# Patient Record
Sex: MALE | Race: WHITE | NOT HISPANIC OR LATINO
[De-identification: names, ages, dates, MRNs, and addresses within clinical notes are randomized per-mention and may not be internally consistent; named-entity substitution may affect disease eponyms.]

---

## 2019-04-24 PROBLEM — Z00.00 ENCOUNTER FOR PREVENTIVE HEALTH EXAMINATION: Status: ACTIVE | Noted: 2019-04-24

## 2019-04-26 ENCOUNTER — APPOINTMENT (OUTPATIENT)
Dept: PHYSICAL MEDICINE AND REHAB | Facility: CLINIC | Age: 59
End: 2019-04-26
Payer: COMMERCIAL

## 2019-04-26 VITALS — BODY MASS INDEX: 25.73 KG/M2 | WEIGHT: 190 LBS | HEIGHT: 72 IN

## 2019-04-26 DIAGNOSIS — Z78.9 OTHER SPECIFIED HEALTH STATUS: ICD-10-CM

## 2019-04-26 PROCEDURE — 99214 OFFICE O/P EST MOD 30 MIN: CPT

## 2019-04-26 PROCEDURE — 72100 X-RAY EXAM L-S SPINE 2/3 VWS: CPT

## 2019-04-26 RX ORDER — CETIRIZINE HYDROCHLORIDE 10 MG/1
CAPSULE, LIQUID FILLED ORAL
Refills: 0 | Status: ACTIVE | COMMUNITY

## 2019-04-26 NOTE — HISTORY OF PRESENT ILLNESS
[FreeTextEntry1] : Location: back\par Quality: ache\par Severity: 4/10\par Duration: 1 month\par Timing: acute\par Context: training for 10 mile run\par Aggravating Factors: after run\par Alleviating Factors: stretch, rest\par Associated Symptoms: denies weight loss, fever, chills, change in bowel/bladder habits, redness, warmth, weakness, numbness/tingling, radiation down \par Prior Studies:\par

## 2019-04-26 NOTE — PHYSICAL EXAM
[FreeTextEntry1] : TRYA is a 58 year male \par Constitutional: healthy appearing, NAD, and overweight\par \par LUMBAR\par ROM: flexion to 40 deg, ext to 5 deg with pain\par \par Gait: normal\par \par Inspection: no erythema, warmth\par Spine: no TTP in spinous process, SI joint, sacrum\par Bony palpation: no TTP in GT\par \par Soft tissue palpation hip: no TTP in gluteus khloe, medius\par Soft tissue palpation of spine: no TTP in lumbar paraspinals\par \par 5/5 bilateral HF, KE, DF, PF \par      left hip abd 4/5\par sensation intact in bilat LE\par reflexes: knee and ankle 1+ bilat\par \par Special tests: neg seated SLR\par          poor single leg squat\par

## 2019-04-26 NOTE — ASSESSMENT
[FreeTextEntry1] : Ice area often. Does not want PT.  Gave exercises to do. Run in both directions in park.

## 2020-01-14 ENCOUNTER — APPOINTMENT (OUTPATIENT)
Dept: ORTHOPEDIC SURGERY | Facility: CLINIC | Age: 60
End: 2020-01-14
Payer: COMMERCIAL

## 2020-01-14 DIAGNOSIS — M94.269 CHONDROMALACIA, UNSPECIFIED KNEE: ICD-10-CM

## 2020-01-14 PROCEDURE — 73562 X-RAY EXAM OF KNEE 3: CPT | Mod: LT

## 2020-01-14 PROCEDURE — 99213 OFFICE O/P EST LOW 20 MIN: CPT

## 2020-01-14 NOTE — ASSESSMENT
[FreeTextEntry1] : Discussed at length with patient exam history and imaging.  If no improvement with home exercises and physical therapy consideration of cortisone shot and ultimately if no improvement MRI with possible need for lateral release

## 2020-01-14 NOTE — HISTORY OF PRESENT ILLNESS
[de-identified] : Patient reports that the LEFT knee has been bothering him for about 1.5 months now, atraumatic. Sharp pain with certain movements. Knee is achy at night. Tender to the touch. Pain mostly in the front of the knee and to the side. Sharp pain with twisting/turning. Some pain with bending.

## 2020-01-14 NOTE — PHYSICAL EXAM
[de-identified] : X-ray left knee.  There is no significant bony / soft tissue abnormality, arthritis, or fracture except mild lateral patellar tilt\par \par \par \par \par \par  [de-identified] : Left knee\par \par Constitutional: \par The patient is healthy-appearing and in no apparent distress. \par \par Gait:\par The patient ambulates with a normal gait and no limp.\par \par Cardiovascular System: \par The capillary refill is less than 2 seconds. \par \par Skin: \par There are no skin abnormalities.\par \par Left Knee: \par \par Bony Palpation: \par There is no tenderness of the medial joint line. \par There is no tenderness of the lateral joint line.\par There is no tenderness of the medial femoral chondyle.\par There is no tenderness of the lateral femoral chondyle.\par There is no tenderness of the tibial tubercle.\par There is no tenderness of the superior patella.\par There is no tenderness of the inferior patella.\par There is tenderness of the medial patellar facet.\par There is tenderness of the lateral patellar facet.\par \par Soft Tissue Palpation: \par There is no tenderness of the medial retinaculum.\par There is no tenderness of the lateral retinaculum.\par There is no tenderness of the quadriceps tendon.\par There is no tenderness of the patella tendon.\par There is no tenderness of the ITB.\par There is no tenderness of the pes anserine.\par \par Active Range of Motion: \par The range of motion at the knee actively and passively is full. \par There is lateral patellar retinaculum tightness/hypomobility.\par \par Special Tests: \par There is a negative Apley.\par There is a negative Steinmanns. \par There is a negative Lachman and Anterior Drawer.\par There is a negative Posterior Drawer.  \par There is no varus or valgus laxity.\par \par Strength: \par There is 4/5 hip flexion and 5/5 knee flexion and extension.  \par \par Psychiatric: \par The patient demonstrates a normal mood and affect and is active and alert.\par \par

## 2020-12-03 ENCOUNTER — APPOINTMENT (OUTPATIENT)
Dept: ORTHOPEDIC SURGERY | Facility: CLINIC | Age: 60
End: 2020-12-03
Payer: COMMERCIAL

## 2020-12-03 PROCEDURE — 99213 OFFICE O/P EST LOW 20 MIN: CPT | Mod: 95

## 2020-12-03 RX ORDER — NABUMETONE 500 MG/1
500 TABLET, FILM COATED ORAL
Qty: 60 | Refills: 2 | Status: ACTIVE | COMMUNITY
Start: 2020-12-03 | End: 1900-01-01

## 2020-12-09 NOTE — ASSESSMENT
[FreeTextEntry1] : Discussed at length with patient limitations of patella health and observed for exam as well symptoms.  Patient elects home exercise at this time if no improvement in 3-4 weeks to follow up in the office for formal evaluation imaging and possible injection.\par

## 2020-12-09 NOTE — PHYSICAL EXAM
[de-identified] : Left Shoulder:\par Constitutional:\par The patient is healthy-appearing and in no apparent distress. \par \par Cardiovascular System: \par The capillary refill is less than 2 seconds. \par \par Skin: \par There are no skin abnormalities.\par \par C-Spine/Neck:\par \par Active Range of Motion:\par Flexion				50\par Extension			60\par Lateral rotation			80  \par \par Left Shoulder: \par Inspection: \par There is no atrophy, erythema, warmth, swelling.\par There is no scapular winging.\par There is no AC prominence. \par \par Bony Palpation: (per patient palpation)\par There is no tenderness of the clavicle.\par There is no tenderness of the acromioclavicular joint.\par There is no tenderness of the greater tuberosity. \par There is no tenderness of the bicipital groove.\par  \par Soft Tissue Palpation: (per patient palpation)\par There is no tenderness of the trapezius.\par There is no tenderness of the rhomboid.\par There is no tenderness of the subacromial bursa. \par \par Active Range of Motion: \par Forward flexion- 				180 \par Abduction-					150\par External rotation at 0 degrees abduction-	80 \par Internal rotation at 0 degrees abduction-	80\par \par Psychiatric: \par The patient demonstrates a normal mood and affect and is active and alert [de-identified] : None available

## 2020-12-09 NOTE — HISTORY OF PRESENT ILLNESS
[de-identified] : Telehealth video appointment was performed using [employer] application from 11:00 am  to 11:20 am .\par The patient was sent written consent forms regarding TeleHealth and verbal consent to proceed with a TeleHealth appointment was given by the patient today.  Patient understands that TeleHealth appointments may be limited in regards to diagnosis and treatment recommendations compared to in-person / in-office consultations and if persistent concerns/symptoms, an in-office evaluation would be recommended.  The patient elects to proceed with a TeleHealth appointment today.\par Patient reports having atraumatic onset of left shoulder discomfort last 2 months localized loss but the shoulder he denies any weakness  denies any neck pain.  Denies any paresthesias.. \par

## 2021-01-13 ENCOUNTER — APPOINTMENT (OUTPATIENT)
Dept: ORTHOPEDIC SURGERY | Facility: CLINIC | Age: 61
End: 2021-01-13
Payer: COMMERCIAL

## 2021-01-13 PROCEDURE — 99213 OFFICE O/P EST LOW 20 MIN: CPT | Mod: 95

## 2021-01-13 NOTE — PHYSICAL EXAM
[de-identified] : Left Shoulder:\par Constitutional:\par The patient is healthy-appearing and in no apparent distress. \par \par Cardiovascular System: \par The capillary refill is less than 2 seconds. \par \par Skin: \par There are no skin abnormalities.\par \par C-Spine/Neck:\par \par Active Range of Motion:\par Flexion				50\par Extension			60\par Lateral rotation			80  \par \par Left Shoulder: \par Inspection: \par There is no atrophy, erythema, warmth, swelling.\par There is no scapular winging.\par There is no AC prominence. \par \par Bony Palpation: (per patient palpation)\par There is no tenderness of the clavicle.\par There is no tenderness of the acromioclavicular joint.\par There is no tenderness of the greater tuberosity. \par There is no tenderness of the bicipital groove.\par  \par Soft Tissue Palpation: (per patient palpation)\par There is no tenderness of the trapezius.\par There is no tenderness of the rhomboid.\par There is no tenderness of the subacromial bursa. \par \par Active Range of Motion: \par Forward flexion- 				180 \par Abduction-					150\par External rotation at 0 degrees abduction-	80 \par Internal rotation at 0 degrees abduction-	80\par \par Psychiatric: \par The patient demonstrates a normal mood and affect and is active and alert

## 2021-01-13 NOTE — HISTORY OF PRESENT ILLNESS
[de-identified] : Telehealth video appointment was performed using [employer] application from 9:00 am  to 9:15 am .\par The patient was sent written consent forms regarding TeleHealth and verbal consent to proceed with a TeleHealth appointment was given by the patient today.  Patient understands that TeleHealth appointments may be limited in regards to diagnosis and treatment recommendations compared to in-person / in-office consultations and if persistent concerns/symptoms, an in-office evaluation would be recommended.  The patient elects to proceed with a TeleHealth appointment today.\par Patient reports having atraumatic onset of left shoulder discomfort last 3 months.  States persistent soreness despite home exericses.   Denies any weakness  denies any neck pain.  Denies any paresthesias.. \par

## 2021-01-13 NOTE — ASSESSMENT
[FreeTextEntry1] : Discussed at length again with patient her symptoms as well as chronicity and prior treatment options.  Offered him an office evaluation with cortisone injection.  Patient elects to continue to observe and will try nabumetone.  If persistent pain/symptoms, follow-up in office

## 2021-02-05 ENCOUNTER — TRANSCRIPTION ENCOUNTER (OUTPATIENT)
Age: 61
End: 2021-02-05

## 2021-02-11 ENCOUNTER — APPOINTMENT (OUTPATIENT)
Dept: ORTHOPEDIC SURGERY | Facility: CLINIC | Age: 61
End: 2021-02-11
Payer: COMMERCIAL

## 2021-02-11 PROCEDURE — 73030 X-RAY EXAM OF SHOULDER: CPT | Mod: LT

## 2021-02-11 PROCEDURE — 99213 OFFICE O/P EST LOW 20 MIN: CPT | Mod: 25

## 2021-02-11 PROCEDURE — 99072 ADDL SUPL MATRL&STAF TM PHE: CPT

## 2021-02-11 PROCEDURE — 20610 DRAIN/INJ JOINT/BURSA W/O US: CPT | Mod: LT

## 2021-02-11 NOTE — HISTORY OF PRESENT ILLNESS
[de-identified] : Patient is presenting for further evaluation of his LEFT shoulder.  States persistent soreness despite home exercises.   Denies any weakness or any neck pain.  Denies any paresthesias.. \par

## 2021-02-11 NOTE — PHYSICAL EXAM
[de-identified] : Left Shoulder:\par Constitutional:\par The patient is healthy-appearing and in no apparent distress. \par \par Cardiovascular System: \par The capillary refill is less than 2 seconds. \par \par Skin: \par There are no skin abnormalities.\par \par C-Spine/Neck:\par \par Active Range of Motion:\par Flexion				50\par Extension			60\par Lateral rotation			80  \par \par Left Shoulder: \par Inspection: \par There is no atrophy, erythema, warmth, swelling.\par There is no scapular winging.\par There is no AC prominence. \par \par Bony Palpation: \par There is no tenderness of the clavicle.\par There is no tenderness of the acromioclavicular joint.\par There is no tenderness of the greater tuberosity. \par There is no tenderness of the bicipital groove.\par  \par Soft Tissue Palpation: \par There is no tenderness of the trapezius.\par There is no tenderness of the rhomboid.\par There is no tenderness of the subacromial bursa. \par \par Active Range of Motion: \par Forward flexion- 				120 \par Abduction-					80\par External rotation at 0 degrees abduction-	30 \par Internal rotation at 0 degrees abduction-	80\par \par Passive Range of Motion: \par Forward flexion- 			160 \par Abduction-				100\par External rotation at 0 deg abduction-	45 \par Internal rotation at 0 deg abduction-	80\par \par Strength:\par Supraspinatus / Abduction                  5/5\par External rotation                                 5/5\par Internal roation                                   5/5\par \par Special Tests: \par Hawkin's  				Positive \par Neer's  				Positive \par Speed's  				Negative\par AC cross-over 			            Negative\par Albemarle's  				Negative\par \par Neurological System: \par \par There is normal sensation to light touch C5-T1. \par \par Stability: \par There is no general laxity. \par \par Psychiatric: \par The patient demonstrates a normal mood and affect and is active and alert [de-identified] : X-ray left shoulder.  There is no significant bony / soft tissue abnormality, arthritis, or fracture.\par

## 2021-02-11 NOTE — ASSESSMENT
[FreeTextEntry1] : Discussed at length with patient overall exam history prior treatment options and imaging.  Treatment options reviewed at this time patient elects cortisone injection and if no improvement over the ensuing week patient is to call for MRI of his shoulder.  Patient agrees with plan\par

## 2021-02-11 NOTE — PROCEDURE
[de-identified] : Patient has demonstrated limited relief from NSAIDS, rest, exercises / PT, and after discussion of the risks and benefits, the patient has elected to proceed with a corticosteroid injection into the LEFT shoulder via Posterolateral site.\par Confirmed that the patient does not have history of prior adverse reactions, active, infections, or relevant allergies.   There was no erythema or warmth, and the skin was clear.  The skin was sterilized with alcohol and via sterile technique, the shoulder was injected with 3 cc of 1% xylocaine and 40 mg of Kenalog.  The injection was completed without complication and a bandage was applied.  The patient tolerated the procedure well and was given post-injection instructions.

## 2021-03-17 ENCOUNTER — APPOINTMENT (OUTPATIENT)
Dept: ORTHOPEDIC SURGERY | Facility: CLINIC | Age: 61
End: 2021-03-17
Payer: COMMERCIAL

## 2021-03-17 DIAGNOSIS — M75.50 BURSITIS OF UNSPECIFIED SHOULDER: ICD-10-CM

## 2021-03-17 PROCEDURE — 99212 OFFICE O/P EST SF 10 MIN: CPT | Mod: 95

## 2021-03-18 PROBLEM — M75.50 SHOULDER BURSITIS: Status: ACTIVE | Noted: 2020-12-03

## 2021-03-18 NOTE — HISTORY OF PRESENT ILLNESS
[de-identified] : Telehealth video appointment was performed using [employer] application from 2:04 pm  to 2:15 pm .\par The patient was sent written consent forms regarding TeleHealth and verbal consent to proceed with a TeleHealth appointment was given by the patient today.  Patient understands that TeleHealth appointments may be limited in regards to diagnosis and treatment recommendations compared to in-person / in-office consultations and if persistent concerns/symptoms, an in-office evaluation would be recommended.  The patient elects to proceed with a TeleHealth appointment today.\par \par Discussed at length with the patient will improvement with cortisone injection but persistent discomfort.  Patient states he is no longer having sharp discomfort but baseline discomfort.  States he still doing home exercises

## 2021-03-18 NOTE — PHYSICAL EXAM
[de-identified] : Discussion for 15 minutes regarding prior treatments as well as recommendation at this time for MRI.  Patient elects to observe at this time and if persistent he will call for MRI .

## 2021-04-12 ENCOUNTER — APPOINTMENT (OUTPATIENT)
Age: 61
End: 2021-04-12
Payer: COMMERCIAL

## 2021-04-12 ENCOUNTER — APPOINTMENT (OUTPATIENT)
Dept: DISASTER EMERGENCY | Facility: OTHER | Age: 61
End: 2021-04-12

## 2021-04-12 PROCEDURE — 0002A: CPT

## 2022-09-21 ENCOUNTER — APPOINTMENT (OUTPATIENT)
Dept: ORTHOPEDIC SURGERY | Facility: CLINIC | Age: 62
End: 2022-09-21

## 2022-09-21 PROCEDURE — 99212 OFFICE O/P EST SF 10 MIN: CPT | Mod: 95

## 2022-09-21 RX ORDER — NABUMETONE 500 MG/1
500 TABLET, FILM COATED ORAL
Qty: 60 | Refills: 0 | Status: ACTIVE | COMMUNITY
Start: 2022-09-21 | End: 1900-01-01

## 2022-09-21 RX ORDER — METAXALONE 800 MG/1
800 TABLET ORAL 3 TIMES DAILY
Qty: 30 | Refills: 1 | Status: ACTIVE | COMMUNITY
Start: 2022-09-21 | End: 1900-01-01

## 2022-09-22 ENCOUNTER — TRANSCRIPTION ENCOUNTER (OUTPATIENT)
Age: 62
End: 2022-09-22

## 2022-09-22 NOTE — ASSESSMENT
[FreeTextEntry1] : Discussed at length the patient exam history and imaging at this time patient elects to try home exercises as well as physical therapy and prescription anti-inflammatory muscle relaxer.  Discussed with patient if no improvement or any onset of neurologic weakness worsening of symptoms he is to call the office or present to emergency room and a persistent definitely will require an office evaluation with imaging and possible MRI.\par

## 2022-09-22 NOTE — HISTORY OF PRESENT ILLNESS
[de-identified] : Telehealth video appointment was performed using [employer] application from 10:35 am to 10:50 am  .\par The patient was sent written consent forms regarding TeleHealth and verbal consent to proceed with a TeleHealth appointment was given by the patient today.  Patient understands that TeleHealth appointments may be limited in regards to diagnosis and treatment recommendations compared to in-person / in-office consultations and if persistent concerns/symptoms, an in-office evaluation would be recommended.  The patient elects to proceed with a TeleHealth appointment today.\par Patient is an established patient with a prior history of lumbar back discomfort.  He states her last several weeks she's had intermittent soreness which is fine at rest but with activity has increased pain in her lumbar back which will radiate mildly into the buttock but denies any radicular symptoms past the buttock denies any groin pain denies any bowel or bladder changes denies any weakness.

## 2022-09-22 NOTE — HISTORY OF PRESENT ILLNESS
[de-identified] : Telehealth video appointment was performed using [employer] application from 10:35 am to 10:50 am  .\par The patient was sent written consent forms regarding TeleHealth and verbal consent to proceed with a TeleHealth appointment was given by the patient today.  Patient understands that TeleHealth appointments may be limited in regards to diagnosis and treatment recommendations compared to in-person / in-office consultations and if persistent concerns/symptoms, an in-office evaluation would be recommended.  The patient elects to proceed with a TeleHealth appointment today.\par Patient is an established patient with a prior history of lumbar back discomfort.  He states her last several weeks she's had intermittent soreness which is fine at rest but with activity has increased pain in her lumbar back which will radiate mildly into the buttock but denies any radicular symptoms past the buttock denies any groin pain denies any bowel or bladder changes denies any weakness.

## 2022-09-22 NOTE — PHYSICAL EXAM
[de-identified] : Lumbar back\par \par Constitutional: \par The patient is healthy-appearing and in no apparent distress. \par \par Gait and Station: \par The patient ambulates with a normal gait, no limp. \par \par Lumbar Spine: \par \par Active Range of Motion: \par There is normal lateral flexion and rotation. \par \par Motor Strength: \par There is no gross observed motor weakness of the lower extremities.\par \par Psychiatric: \par The patient demonstrates a normal mood and affect and is active and alert.

## 2022-09-22 NOTE — PHYSICAL EXAM
[de-identified] : Lumbar back\par \par Constitutional: \par The patient is healthy-appearing and in no apparent distress. \par \par Gait and Station: \par The patient ambulates with a normal gait, no limp. \par \par Lumbar Spine: \par \par Active Range of Motion: \par There is normal lateral flexion and rotation. \par \par Motor Strength: \par There is no gross observed motor weakness of the lower extremities.\par \par Psychiatric: \par The patient demonstrates a normal mood and affect and is active and alert.

## 2022-12-20 ENCOUNTER — APPOINTMENT (OUTPATIENT)
Dept: ORTHOPEDIC SURGERY | Facility: CLINIC | Age: 62
End: 2022-12-20

## 2023-03-13 ENCOUNTER — APPOINTMENT (OUTPATIENT)
Dept: ORTHOPEDIC SURGERY | Facility: CLINIC | Age: 63
End: 2023-03-13

## 2023-03-20 ENCOUNTER — APPOINTMENT (OUTPATIENT)
Dept: ORTHOPEDIC SURGERY | Facility: CLINIC | Age: 63
End: 2023-03-20
Payer: COMMERCIAL

## 2023-03-20 DIAGNOSIS — M54.50 LOW BACK PAIN, UNSPECIFIED: ICD-10-CM

## 2023-03-20 PROCEDURE — 99213 OFFICE O/P EST LOW 20 MIN: CPT

## 2023-03-20 PROCEDURE — 72100 X-RAY EXAM L-S SPINE 2/3 VWS: CPT

## 2023-03-21 NOTE — PHYSICAL EXAM
[de-identified] : Lumbar back\par \par Constitutional: \par The patient is healthy-appearing and in no apparent distress. \par \par Gait and Station: \par The patient ambulates with a normal gait, no limp. \par \par Cardiovascular System: \par There is bilateral lower extremity capillary refill less than 2 seconds. \par \par Skin: \par There are no skin abnormalities of the lumbar spine.\par \par Lumbar Spine: \par \par Inspection: \par There is no induration, ecchymosis, or swelling. \par \par Bony Palpation: \par There is no tenderness of the spinous processes.\par There is no tenderness of the iliac crest.\par There is no tenderness of either ASIS.\par There is no tenderness of either PSIS\par There is no tenderness of the greater trochanters.\par There is no tenderness of the right SI joint.\par There is no tenderness of the left SI joint. \par \par Soft Tissue Palpation:\par There is tenderness of the paraspinal region at L4/5. \par  \par Active Range of Motion: \par There is normal lateral flexion and rotation. \par \par Motor Strength: \par There is 5/5 hip flexion, knee extension and flexion, ankle dorsiflexion and plantarflexion.\par \par Neurological System: \par There is normal sensation to light touch on bilateral lower extremities.\par There is a negative supine straight leg raising test.\par There is a negative seated straight leg raising test.  \par There is no clonus. \par \par Psychiatric: \par The patient demonstrates a normal mood and affect and is active and alert. [de-identified] : Given patient's reported history and physical examination, x-ray evaluation ( as listed below ) was ordered and performed to aid in diagnosis and treatment of the patient.\par X-rays lumbar spine.  There is loss lordosis and moderate L4-5 L5-S1 foraminal stenosis\par

## 2023-03-21 NOTE — HISTORY OF PRESENT ILLNESS
[de-identified] : The patient is an established patient presented with persistent lumbar back pain which has failed to significantly improve with rest activity modification and home exercises.

## 2023-06-26 ENCOUNTER — APPOINTMENT (OUTPATIENT)
Dept: PHYSICAL MEDICINE AND REHAB | Facility: CLINIC | Age: 63
End: 2023-06-26
Payer: COMMERCIAL

## 2023-06-26 PROCEDURE — 73030 X-RAY EXAM OF SHOULDER: CPT | Mod: RT

## 2023-06-26 PROCEDURE — 99204 OFFICE O/P NEW MOD 45 MIN: CPT

## 2023-06-26 RX ORDER — MELOXICAM 7.5 MG/1
7.5 TABLET ORAL TWICE DAILY
Qty: 40 | Refills: 0 | Status: ACTIVE | COMMUNITY
Start: 2023-06-26 | End: 1900-01-01

## 2023-06-26 NOTE — ASSESSMENT
[FreeTextEntry1] : MRI lumbar shows L3/4 and L4/5 mod stenosis, L5/S disc bulge pinching S1.\par \par Discussed diagnosis and treatment plan including PT.\par Discussed CARLEE.\par Limit back ext and flex.\par Reviewed exercises to do.\par stop other nsaids\par \par shoulder - Discussed diagnosis and treatment plan including PT.\par reviewed exercises to do\par discussed injection\par \par f/u 2-4 wk

## 2023-06-26 NOTE — DATA REVIEWED
[FreeTextEntry1] : office xrays of right shoulder ap and outlet show no gross abnormalities or fracture.

## 2023-06-26 NOTE — PHYSICAL EXAM
[FreeTextEntry1] : TYRA is a 62 year old male \par Constitutional: healthy appearing, NAD, and overweight\par \par LUMBAR\par ROM: flexion to 30 deg, ext to 5 deg\par \par Gait: normal\par \par Inspection: no erythema, warmth\par Spine: no TTP in spinous process\par Bony palpation: no TTP in GT\par \par Soft tissue palpation hip: no TTP in gluteus khloe\par Soft tissue palpation of spine: no TTP in lumbar paraspinals\par \par 5/5 bilateral KE, DF, PF \par sensation intact in bilat LE\par reflexes: knee and ankle \par \par Special tests: neg seated SLR\par \par right SHOULDER:\par abd to 90, flexion to 120 deg\par

## 2023-06-26 NOTE — HISTORY OF PRESENT ILLNESS
[FreeTextEntry1] : Location: back\par Severity: moderate, slowly improving\par Duration: over 4 yr, worse last Sept\par Context: \par Aggravating Factors: walking\par Alleviating Factors: rest\par Associated Symptoms: denies weight loss, fever, chills, change in bowel/bladder habits, weakness, +numbness/tingling, radiation down right leg\par Prior Studies: MRI\par \par Right shoulder hurting lately.  Hx left shoulder impingement tx with PT and injection.  Pain with abduction.

## 2023-07-21 ENCOUNTER — APPOINTMENT (OUTPATIENT)
Dept: PHYSICAL MEDICINE AND REHAB | Facility: CLINIC | Age: 63
End: 2023-07-21
Payer: COMMERCIAL

## 2023-07-21 DIAGNOSIS — M48.061 SPINAL STENOSIS, LUMBAR REGION WITHOUT NEUROGENIC CLAUDICATION: ICD-10-CM

## 2023-07-21 DIAGNOSIS — M75.41 IMPINGEMENT SYNDROME OF RIGHT SHOULDER: ICD-10-CM

## 2023-07-21 PROCEDURE — 20611 DRAIN/INJ JOINT/BURSA W/US: CPT | Mod: RT

## 2023-07-21 PROCEDURE — 99213 OFFICE O/P EST LOW 20 MIN: CPT | Mod: 25

## 2023-07-21 NOTE — ASSESSMENT
[FreeTextEntry1] : MRI lumbar shows L3/4 and L4/5 mod stenosis, L5/S disc bulge pinching S1.\par \par Discussed diagnosis and treatment plan including PT.\par consider CARLEE after PT\par Limit back ext and flex.\par Try ibuprofen 600 mg with food\par \par shoulder - Discussed diagnosis and treatment plan including PT.\par May be developing frozen shoulder.  Consider GH injection\par \par f/u 2 wk

## 2023-07-21 NOTE — PROCEDURE
[de-identified] : indication: pain\par \par Ultrasound Guided Right Shoulder Injection\par \par After discussion of the risks and benefits, he  elected to proceed with a corticosteroid injection into the subacromial space. Hair was trimmed with scissors.\par \par Confirmed that the patient does not have history of prior adverse reactions, active infections, or relevant allergies. There was no effusion, erythema, or warmth, and the skin was clear.\par \par The skin was sterilized and then anesthetized with 1% Lidocaine. Under routine sterile technique, the site was injected with a mixture of 3 ml of 1% Lidocaine and 20 mg of Kenalog using ultrasound guidance. The injection was completed without complication and a bandage was applied.   \par \par He tolerated the procedure well and was given post-injection instructions.Rec: Cold therapy, analgesics, avoid heavy activity.\par \par \par Triamcinolone\par NDC [address]-1\par Exp 6/24\par Lot [id number]\par Manufacture [employer]\par \par \par Lidocaine\par Manufacture [employer]\par NDC 63866-514-50\par Exp 6/24\par LOT [id number]\par \par

## 2023-07-21 NOTE — HISTORY OF PRESENT ILLNESS
[FreeTextEntry1] : Location: back\par Severity: moderate\par Duration: over 4 yr, worse last Sept\par Context: \par Aggravating Factors: walking\par Alleviating Factors: rest\par Associated Symptoms: denies weight loss, fever, chills, change in bowel/bladder habits, weakness, +numbness/tingling, radiation down thighs\par Prior Studies: MRI\par \par Right shoulder hurting still. Hx left shoulder impingement tx with PT and injection. Pain with abduction. Xray 6/2023. \par  \par

## 2023-07-21 NOTE — PHYSICAL EXAM
[FreeTextEntry1] : TYRA is a 63 year old male \par Constitutional: healthy appearing, NAD, and overweight\par \par LUMBAR\par ROM: flexion to 30 deg, ext to 5 deg\par \par Gait: normal\par \par Inspection: no erythema, warmth\par Spine: no TTP in spinous process\par Bony palpation: no TTP in GT\par \par Soft tissue palpation hip: no TTP in gluteus khloe\par Soft tissue palpation of spine: no TTP in lumbar paraspinals\par \par 5/5 bilateral KE, DF, PF \par sensation intact in bilat LE\par \par Special tests: neg seated SLR\par \par right SHOULDER:\par abd to 70, flexion to 120 deg, ER to 70, IR to 20

## 2023-08-09 ENCOUNTER — APPOINTMENT (OUTPATIENT)
Dept: PHYSICAL MEDICINE AND REHAB | Facility: CLINIC | Age: 63
End: 2023-08-09
Payer: COMMERCIAL

## 2023-08-09 DIAGNOSIS — M75.01 ADHESIVE CAPSULITIS OF RIGHT SHOULDER: ICD-10-CM

## 2023-08-09 DIAGNOSIS — M51.26 OTHER INTERVERTEBRAL DISC DISPLACEMENT, LUMBAR REGION: ICD-10-CM

## 2023-08-09 PROCEDURE — 20611 DRAIN/INJ JOINT/BURSA W/US: CPT | Mod: RT

## 2023-08-09 PROCEDURE — 99213 OFFICE O/P EST LOW 20 MIN: CPT | Mod: 25

## 2023-08-09 NOTE — PROCEDURE
[de-identified] : indication: pain  Ultrasound Guided Right Shoulder Injection  After discussion of the risks and benefits, he  elected to proceed with a corticosteroid injection into the GH space.  Confirmed that the patient does not have history of prior adverse reactions, active infections, or relevant allergies. There was no effusion, erythema, or warmth, and the skin was clear.  The skin was sterilized and then anesthetized with 1% Lidocaine. Under routine sterile technique, the site was injected with a mixture of 3 ml of 1% Lidocaine and 20 mg of Kenalog using ultrasound guidance. The injection was completed without complication and a bandage was applied.     He tolerated the procedure well and was given post-injection instructions.Rec: Cold therapy, analgesics, avoid heavy activity.   Triamcinolone NDC [address]-0 Exp 6/24 Lot [id number] Manufacture [employer]   Lidocaine Manufacture [employer] NDC 32010-409-51 Exp 6/24 LOT [id number]

## 2023-08-09 NOTE — PHYSICAL EXAM
[FreeTextEntry1] : TYRA is a 63 year old male  Constitutional: healthy appearing, NAD, and overweight    LUMBAR  ROM: flexion to 30 deg, ext to 5 deg  Gait: normal  Inspection: no erythema, warmth Spine: no TTP in spinous process Bony palpation: no TTP in GT  Soft tissue palpation hip: no TTP in gluteus khloe Soft tissue palpation of spine: no TTP in lumbar paraspinals  5/5 bilateral KE, DF, PF  sensation intact in bilat LE    Special tests: neg seated SLR    right SHOULDER:  abd to 85, flexion to 120 deg, ER to 60, IR to 5

## 2023-08-09 NOTE — HISTORY OF PRESENT ILLNESS
[FreeTextEntry1] : Location: back Severity: 4/10 Duration: over 4 yr, worse last Sept Aggravating Factors: walking Alleviating Factors: rest Associated Symptoms: denies weight loss, fever, chills, change in bowel/bladder habits, weakness, +numbness/tingling, radiation down thighs Prior Studies: MRI   Right shoulder hurting still. Hx left shoulder impingement tx with PT and injection. Pain with abduction. Xray 6/2023. 7/2023 right subacromial injection

## 2023-08-09 NOTE — ASSESSMENT
[FreeTextEntry1] : MRI lumbar shows L3/4 and L4/5 mod stenosis, L5/S disc bulge pinching S1.  Discussed diagnosis and treatment plan including PT. He has tried conservative tx including PT, nsaids for 3 months without relief. Discussed CARLEE in detail.  Risks include bleeding.  Stop nsaids 2 days before.  He will continue a comprehensive rehabilitation program afterward, as this is important to prevent recurrence of pain. Do not do strengthening exercises every day. Do more bridges.  shoulder - Discussed diagnosis and treatment plan including PT. Educated frozen shoulder can take up to 2 years to heal.  Needs to stretch daily multiple times after heat.

## 2023-09-26 ENCOUNTER — APPOINTMENT (OUTPATIENT)
Dept: OPHTHALMOLOGY | Facility: CLINIC | Age: 63
End: 2023-09-26
Payer: COMMERCIAL

## 2023-09-26 ENCOUNTER — NON-APPOINTMENT (OUTPATIENT)
Age: 63
End: 2023-09-26

## 2023-09-26 PROCEDURE — 92250 FUNDUS PHOTOGRAPHY W/I&R: CPT

## 2023-09-26 PROCEDURE — 76512 OPH US DX B-SCAN: CPT | Mod: RT

## 2023-09-26 PROCEDURE — 92004 COMPRE OPH EXAM NEW PT 1/>: CPT

## 2023-09-29 ENCOUNTER — APPOINTMENT (OUTPATIENT)
Dept: OPHTHALMOLOGY | Facility: CLINIC | Age: 63
End: 2023-09-29

## 2023-10-16 ENCOUNTER — NON-APPOINTMENT (OUTPATIENT)
Age: 63
End: 2023-10-16

## 2023-10-16 ENCOUNTER — APPOINTMENT (OUTPATIENT)
Dept: OPHTHALMOLOGY | Facility: CLINIC | Age: 63
End: 2023-10-16
Payer: COMMERCIAL

## 2023-10-16 ENCOUNTER — APPOINTMENT (OUTPATIENT)
Dept: OPHTHALMOLOGY | Facility: CLINIC | Age: 63
End: 2023-10-16

## 2023-10-16 PROCEDURE — 92012 INTRM OPH EXAM EST PATIENT: CPT

## 2023-11-13 ENCOUNTER — APPOINTMENT (OUTPATIENT)
Dept: OPHTHALMOLOGY | Facility: CLINIC | Age: 63
End: 2023-11-13

## 2024-01-08 ENCOUNTER — TRANSCRIPTION ENCOUNTER (OUTPATIENT)
Age: 64
End: 2024-01-08

## 2024-04-22 ENCOUNTER — APPOINTMENT (OUTPATIENT)
Dept: ORTHOPEDIC SURGERY | Facility: CLINIC | Age: 64
End: 2024-04-22
Payer: COMMERCIAL

## 2024-04-22 VITALS — WEIGHT: 190 LBS | BODY MASS INDEX: 25.73 KG/M2 | HEIGHT: 72 IN

## 2024-04-22 DIAGNOSIS — M47.812 SPONDYLOSIS W/OUT MYELOPATHY OR RADICULOPATHY, CERVICAL REGION: ICD-10-CM

## 2024-04-22 PROCEDURE — 72040 X-RAY EXAM NECK SPINE 2-3 VW: CPT

## 2024-04-22 PROCEDURE — 99214 OFFICE O/P EST MOD 30 MIN: CPT

## 2024-04-22 PROCEDURE — 73030 X-RAY EXAM OF SHOULDER: CPT | Mod: LT

## 2024-04-22 RX ORDER — METAXALONE 800 MG/1
800 TABLET ORAL 3 TIMES DAILY
Qty: 30 | Refills: 1 | Status: ACTIVE | COMMUNITY
Start: 2024-04-22 | End: 1900-01-01

## 2024-04-22 RX ORDER — NABUMETONE 500 MG/1
500 TABLET, FILM COATED ORAL
Qty: 60 | Refills: 2 | Status: ACTIVE | COMMUNITY
Start: 2024-04-22 | End: 1900-01-01

## 2024-04-22 NOTE — PHYSICAL EXAM
[de-identified] : Left Shoulder: Constitutional: The patient is healthy-appearing and in no apparent distress.   Cardiovascular System:  The capillary refill is less than 2 seconds.   Skin:  There are no skin abnormalities.  C-Spine/Neck:  Active Range of Motion: Flexion				50 Extension			60 Lateral rotation			80    Left Shoulder:  Inspection:  There is no atrophy, erythema, warmth, swelling. There is no scapular winging. There is no AC prominence.   Bony Palpation:  There is no tenderness of the clavicle. There is no tenderness of the acromioclavicular joint. There is no tenderness of the greater tuberosity.  There is no tenderness of the bicipital groove.   Soft Tissue Palpation:  There is tenderness of the trapezius. There is tenderness of the rhomboid. There is no tenderness of the subacromial bursa.   Active Range of Motion:  Forward flexion- 				180  Abduction-					150 External rotation at 0 degrees abduction-	80  Internal rotation at 0 degrees abduction-	80  Passive Range of Motion:  Forward flexion- 			180  Abduction-				150 External rotation at 0 deg abduction-	80  Internal rotation at 0 deg abduction-	80  Special Tests:  Hawkin's  				Negative  Neer's  				Negative Speed's  				Negative AC cross-over 			            Negative Bleckley's  				Negative  Stability:  There is no general laxity.   Psychiatric:  The patient demonstrates a normal mood and affect and is active and alert  [de-identified] : X-ray left shoulder: There is no significant bony / soft tissue abnormality, arthritis, or fracture. X-ray cervical spine: There is loss of lordosis and mild to moderate see 6 7 disc space narrowing

## 2024-04-22 NOTE — HISTORY OF PRESENT ILLNESS
[de-identified] : New issue: left shoulder pain Prior studies:  x rays ordered Pain: 3/0 Symptoms: pins and needles shooting down the arm /   Physical therapy - much improved

## 2024-04-22 NOTE — ASSESSMENT
[FreeTextEntry1] : Discussed at length with patient exam history and imaging all consistent with cervical origin and he at this time he elects home exercises therapy anti-inflammatory muscle laxer and moist heat if no significant improvement or any onset of weakness he is to call for MRI evaluation of this cervical spine

## 2024-06-27 ENCOUNTER — APPOINTMENT (OUTPATIENT)
Dept: ORTHOPEDIC SURGERY | Facility: CLINIC | Age: 64
End: 2024-06-27

## 2024-07-18 ENCOUNTER — APPOINTMENT (OUTPATIENT)
Dept: PHYSICAL MEDICINE AND REHAB | Facility: CLINIC | Age: 64
End: 2024-07-18
Payer: COMMERCIAL

## 2024-07-18 DIAGNOSIS — M75.41 IMPINGEMENT SYNDROME OF RIGHT SHOULDER: ICD-10-CM

## 2024-07-18 DIAGNOSIS — M51.26 OTHER INTERVERTEBRAL DISC DISPLACEMENT, LUMBAR REGION: ICD-10-CM

## 2024-07-18 DIAGNOSIS — S63.697A OTHER SPRAIN OF LEFT LITTLE FINGER, INITIAL ENCOUNTER: ICD-10-CM

## 2024-07-18 PROCEDURE — 99213 OFFICE O/P EST LOW 20 MIN: CPT

## 2024-09-03 ENCOUNTER — APPOINTMENT (OUTPATIENT)
Dept: ORTHOPEDIC SURGERY | Facility: CLINIC | Age: 64
End: 2024-09-03
Payer: COMMERCIAL

## 2024-09-03 VITALS — WEIGHT: 200 LBS | HEIGHT: 72 IN | BODY MASS INDEX: 27.09 KG/M2 | RESPIRATION RATE: 16 BRPM

## 2024-09-03 DIAGNOSIS — Z78.9 OTHER SPECIFIED HEALTH STATUS: ICD-10-CM

## 2024-09-03 DIAGNOSIS — M79.642 PAIN IN LEFT HAND: ICD-10-CM

## 2024-09-03 DIAGNOSIS — Z82.61 FAMILY HISTORY OF ARTHRITIS: ICD-10-CM

## 2024-09-03 PROCEDURE — 73130 X-RAY EXAM OF HAND: CPT | Mod: LT

## 2024-09-03 PROCEDURE — 99205 OFFICE O/P NEW HI 60 MIN: CPT

## 2024-09-05 NOTE — CONSULT LETTER
[Consult Letter:] : I had the pleasure of evaluating your patient, [unfilled]. [Please see my note below.] : Please see my note below. [Consult Closing:] : Thank you very much for allowing me to participate in the care of this patient.  If you have any questions, please do not hesitate to contact me. [Sincerely,] : Sincerely, [Dear  ___] : Dear  [unfilled], [FreeTextEntry3] : Antony Aguilar MD Co-Director [facility]]

## 2024-09-05 NOTE — ASSESSMENT
[FreeTextEntry1] : Patient with likely early Dupuytren's disease recommend observation and return in 4 months or sooner if it is progressing quickly which is unlikely

## 2024-09-05 NOTE — PHYSICAL EXAM
[de-identified] : Lateral sheath cord the left small finger ulnar side with a 10 degree PIP contracture.  Multiple knuckle pads bilateral very small first webspace cords [de-identified] : PA lateral oblique x-ray left small finger demonstrate a slightly flexed posture

## 2024-09-05 NOTE — PHYSICAL EXAM
[de-identified] : Lateral sheath cord the left small finger ulnar side with a 10 degree PIP contracture.  Multiple knuckle pads bilateral very small first webspace cords [de-identified] : PA lateral oblique x-ray left small finger demonstrate a slightly flexed posture

## 2024-09-05 NOTE — HISTORY OF PRESENT ILLNESS
[Right] : right hand dominant [FreeTextEntry1] : Patient presents with 2-month status post left small digit PIP joint pain and stiffness and left ulnar-sided hand soreness. He denies any specific injury.  He was seen at urgent care on 7/2024 where x-rays were done showing no evidence of a fracture.  No known history of Dupuytren's disease

## 2024-11-04 ENCOUNTER — NON-APPOINTMENT (OUTPATIENT)
Age: 64
End: 2024-11-04

## 2024-11-04 ENCOUNTER — APPOINTMENT (OUTPATIENT)
Dept: OPHTHALMOLOGY | Facility: CLINIC | Age: 64
End: 2024-11-04
Payer: COMMERCIAL

## 2024-11-04 PROCEDURE — 92134 CPTRZ OPH DX IMG PST SGM RTA: CPT

## 2024-11-04 PROCEDURE — 92201 OPSCPY EXTND RTA DRAW UNI/BI: CPT

## 2024-11-04 PROCEDURE — 92014 COMPRE OPH EXAM EST PT 1/>: CPT

## 2024-12-17 VITALS — BODY MASS INDEX: 27.09 KG/M2 | WEIGHT: 200 LBS | HEIGHT: 72 IN | RESPIRATION RATE: 16 BRPM

## 2024-12-18 ENCOUNTER — APPOINTMENT (OUTPATIENT)
Dept: ORTHOPEDIC SURGERY | Facility: CLINIC | Age: 64
End: 2024-12-18
Payer: COMMERCIAL

## 2024-12-18 DIAGNOSIS — M72.0 PALMAR FASCIAL FIBROMATOSIS [DUPUYTREN]: ICD-10-CM

## 2024-12-18 DIAGNOSIS — M25.649 STIFFNESS OF UNSPECIFIED HAND, NOT ELSEWHERE CLASSIFIED: ICD-10-CM

## 2024-12-18 PROCEDURE — 99214 OFFICE O/P EST MOD 30 MIN: CPT

## 2025-04-22 ENCOUNTER — NON-APPOINTMENT (OUTPATIENT)
Age: 65
End: 2025-04-22

## 2025-04-23 ENCOUNTER — APPOINTMENT (OUTPATIENT)
Dept: ORTHOPEDIC SURGERY | Facility: CLINIC | Age: 65
End: 2025-04-23
Payer: COMMERCIAL

## 2025-04-23 VITALS — RESPIRATION RATE: 16 BRPM | BODY MASS INDEX: 27.09 KG/M2 | HEIGHT: 72 IN | WEIGHT: 200 LBS

## 2025-04-23 DIAGNOSIS — M72.0 PALMAR FASCIAL FIBROMATOSIS [DUPUYTREN]: ICD-10-CM

## 2025-04-23 PROCEDURE — 99213 OFFICE O/P EST LOW 20 MIN: CPT

## 2025-07-18 VITALS — BODY MASS INDEX: 27.09 KG/M2 | HEIGHT: 72 IN | WEIGHT: 200 LBS | RESPIRATION RATE: 16 BRPM

## 2025-07-22 ENCOUNTER — NON-APPOINTMENT (OUTPATIENT)
Age: 65
End: 2025-07-22

## 2025-07-22 ENCOUNTER — APPOINTMENT (OUTPATIENT)
Dept: ORTHOPEDIC SURGERY | Facility: CLINIC | Age: 65
End: 2025-07-22
Payer: COMMERCIAL

## 2025-07-22 DIAGNOSIS — M72.0 PALMAR FASCIAL FIBROMATOSIS [DUPUYTREN]: ICD-10-CM

## 2025-07-22 PROCEDURE — 99213 OFFICE O/P EST LOW 20 MIN: CPT
